# Patient Record
Sex: MALE | Race: WHITE | NOT HISPANIC OR LATINO | Employment: UNEMPLOYED | RURAL
[De-identification: names, ages, dates, MRNs, and addresses within clinical notes are randomized per-mention and may not be internally consistent; named-entity substitution may affect disease eponyms.]

---

## 2023-08-24 ENCOUNTER — OFFICE VISIT (OUTPATIENT)
Dept: CARDIOLOGY | Facility: CLINIC | Age: 58
End: 2023-08-24
Payer: OTHER GOVERNMENT

## 2023-08-24 VITALS
DIASTOLIC BLOOD PRESSURE: 74 MMHG | HEIGHT: 64 IN | WEIGHT: 252 LBS | SYSTOLIC BLOOD PRESSURE: 128 MMHG | BODY MASS INDEX: 43.02 KG/M2 | HEART RATE: 84 BPM | OXYGEN SATURATION: 96 %

## 2023-08-24 DIAGNOSIS — G47.33 OSA (OBSTRUCTIVE SLEEP APNEA): Primary | ICD-10-CM

## 2023-08-24 RX ORDER — PRAVASTATIN SODIUM 40 MG
40 TABLET ORAL DAILY
COMMUNITY
Start: 2021-06-11

## 2023-08-24 RX ORDER — METFORMIN HYDROCHLORIDE 500 MG/1
500 TABLET, EXTENDED RELEASE ORAL
COMMUNITY
Start: 2023-08-10

## 2023-08-24 RX ORDER — LISINOPRIL 20 MG/1
20 TABLET ORAL DAILY
COMMUNITY

## 2023-08-24 RX ORDER — DULAGLUTIDE 0.75 MG/.5ML
INJECTION, SOLUTION SUBCUTANEOUS
COMMUNITY
Start: 2023-08-21

## 2023-08-24 NOTE — ASSESSMENT & PLAN NOTE
Baseline AHI 65.  This is severe sleep apnea.  He is on CPAP therapy.  Download is reviewed with good control and good compliance.  He is benefiting from PAP therapy and we plan to continue PAP therapy.    He reports that he buys all of his supplies from [employer] or [employer].  He reports he does not use a DME as his insurance has not been helpful for the payment of supplies.    Plan follow-up in 1 year or sooner for any JUSTA or PAP concerns.

## 2023-08-24 NOTE — PROGRESS NOTES
"    Follow-Up Sleep Consult     Date:   2023  Name: Mandeep Kwok  :   1965  PCP: Belinda Arora MD    Chief Complaint   Patient presents with    Sleep Apnea       Subjective     History of Present Illness  Mandeep Kwok is a 58 y.o. male who presents today for follow-up on annual follow-up of known history of severe JUSTA.  He is on CPAP therapy.    Sleep and cardiac history:    JUSTA AHI 65 on 2018    Current JUSTA therapy auto CPAP 10 to 17 cm    Hypertension and hyperlipidemia    2022 low risk stress echo    Echo 2022 EF WNL      Current mask used is Nasal Pillows     Device Functioning Well: Yes - yes he is DS#2 that he was gifted by a brother as he had a very old System One that did not get replaced on the [employer].   Mask Fit Comfortable: Yes  Air Flow Comfortable: Yes  DME Helpful for Supplies: he does not use DME due to cost.   Sleep is rested: Yes        Device Download:           The patient's relevant past medical, surgical, family, and social history reviewed and updated in Epic as appropriate.    Past Medical History:   Diagnosis Date    Hypertension     JUSTA (obstructive sleep apnea)      History reviewed. No pertinent surgical history.    No Known Allergies  Prior to Admission medications    Medication Sig Start Date End Date Taking? Authorizing Provider   lisinopril (PRINIVIL,ZESTRIL) 20 MG tablet Take 1 tablet by mouth Daily.   Yes Dora Raymond MD   metFORMIN ER (GLUCOPHAGE-XR) 500 MG 24 hr tablet Take 1 tablet by mouth Daily With Breakfast. 8/10/23  Yes Dora Raymond MD   pravastatin (PRAVACHOL) 40 MG tablet Take 1 tablet by mouth Daily. 21  Yes Dora Raymond MD   Trulicity 0.75 MG/0.5ML solution pen-injector  23  Yes Dora Raymond MD     History reviewed. No pertinent family history.    Objective     Vital Signs:  /74 (BP Location: Right arm, Patient Position: Sitting)   Pulse 84   Ht 162.6 cm (64\")   Wt 114 " kg (252 lb)   SpO2 96%   BMI 43.26 kg/mý     Class 3 Severe Obesity (BMI >=40). Obesity-related health conditions include the following: obstructive sleep apnea, hypertension, diabetes mellitus, and dyslipidemias. Obesity is unchanged. BMI is is above average; BMI management plan is completed. We discussed portion control and increasing exercise.        Physical Exam  HENT:      Head: Normocephalic.      Nose: Nose normal.      Mouth/Throat:      Mouth: Mucous membranes are moist.   Pulmonary:      Effort: Pulmonary effort is normal.   Skin:     General: Skin is warm and dry.   Neurological:      Mental Status: He is alert and oriented to person, place, and time.   Psychiatric:         Mood and Affect: Mood normal.         Behavior: Behavior normal.         Thought Content: Thought content normal.           PAP download reviewed: 7/25 through 8/23/2023.  30-day download.  I have reviewed and interpreted the data.         Assessment and Plan     Diagnoses and all orders for this visit:    1. JUSTA (obstructive sleep apnea) (Primary)  Assessment & Plan:  Baseline AHI 65.  This is severe sleep apnea.  He is on CPAP therapy.  Download is reviewed with good control and good compliance.  He is benefiting from PAP therapy and we plan to continue PAP therapy.    He reports that he buys all of his supplies from [employer] or [employer].  He reports he does not use a DME as his insurance has not been helpful for the payment of supplies.    Plan follow-up in 1 year or sooner for any JUSTA or PAP concerns.          Report if any new/changing symptoms immediately         Follow Up  Return in about 1 year (around 8/24/2024) for JUSTA.  Patient was given instructions and counseling regarding his condition or for health maintenance advice. Please see specific information pulled into the AVS if appropriate.

## 2024-08-22 ENCOUNTER — OFFICE VISIT (OUTPATIENT)
Dept: CARDIOLOGY | Facility: CLINIC | Age: 59
End: 2024-08-22
Payer: OTHER GOVERNMENT

## 2024-08-22 VITALS
HEIGHT: 64 IN | SYSTOLIC BLOOD PRESSURE: 110 MMHG | DIASTOLIC BLOOD PRESSURE: 74 MMHG | OXYGEN SATURATION: 98 % | BODY MASS INDEX: 42.68 KG/M2 | HEART RATE: 86 BPM | WEIGHT: 250 LBS

## 2024-08-22 DIAGNOSIS — G47.33 OSA (OBSTRUCTIVE SLEEP APNEA): Primary | ICD-10-CM

## 2024-08-22 PROCEDURE — 99213 OFFICE O/P EST LOW 20 MIN: CPT | Performed by: NURSE PRACTITIONER

## 2024-08-22 RX ORDER — DULAGLUTIDE 4.5 MG/.5ML
INJECTION, SOLUTION SUBCUTANEOUS
COMMUNITY
Start: 2024-05-25

## 2024-08-22 NOTE — PROGRESS NOTES
Follow-Up Sleep Consult     Date:   2024  Name: Mandeep Kwok  :   1965  PCP: Belinda Arora MD    Chief Complaint   Patient presents with    Sleep Apnea       Subjective     History of Present Illness  Mandeep Kwok is a 59 y.o. male who presents today for follow-up on JUSTA.  He comes in today for his annual follow-up visit on his known history of severe sleep apnea on CPAP therapy.    He reports that since our last visit that he is off his loaner device.  That he did receive his replacement device.  He reports the airflow was a bit strong and he decreased his pressures from 10 to 8 cm.  He reports the airflow is very comfortable.  He reports he is very satisfied with the device.    He denies any chest pain, shortness of breath.  Reports he sees his PCP for his high blood pressure and hyperlipidemia and has completed his annual labs.    Sleep and cardiac history:    JUSTA AHI 65 on 2018    Current JUSTA therapy auto CPAP 8 -14 cm    Hypertension and hyperlipidemia    2022 low risk stress echo    Echo 2022 EF WNL      Current mask used is nasal pillows    Device Functioning Well: Yes  Mask Fit Comfortable: Yes  Air Flow Comfortable: Yes  DME Helpful for Supplies: No, he buys CPAP supplies online due to cost.  Sleep is rested: Yes        Device Download:                 The patient's relevant past medical, surgical, family, and social history reviewed and updated in Epic as appropriate.    Past Medical History:   Diagnosis Date    Hypertension     JUSTA (obstructive sleep apnea)      History reviewed. No pertinent surgical history.    No Known Allergies  Prior to Admission medications    Medication Sig Start Date End Date Taking? Authorizing Provider   lisinopril (PRINIVIL,ZESTRIL) 20 MG tablet Take 1 tablet by mouth Daily.   Yes Dora Raymond MD   metFORMIN ER (GLUCOPHAGE-XR) 500 MG 24 hr tablet Take 1 tablet by mouth Daily With Breakfast. 8/10/23  Yes Dora Raymond  "MD   pravastatin (PRAVACHOL) 40 MG tablet Take 1 tablet by mouth Daily. 6/11/21  Yes Provider, Historical, MD   Trulicity 0.75 MG/0.5ML solution pen-injector  8/21/23  Yes Provider, Historical, MD   Trulicity 4.5 MG/0.5ML solution pen-injector  5/25/24  Yes Provider, Historical, MD     History reviewed. No pertinent family history.    Objective     Vital Signs:  /74   Pulse 86   Ht 162.6 cm (64\")   Wt 113 kg (250 lb)   SpO2 98%   BMI 42.91 kg/m²              Physical Exam  HENT:      Head: Normocephalic.      Nose: Nose normal.      Mouth/Throat:      Mouth: Mucous membranes are moist.   Pulmonary:      Effort: Pulmonary effort is normal.   Skin:     General: Skin is warm and dry.   Neurological:      Mental Status: He is alert and oriented to person, place, and time.   Psychiatric:         Mood and Affect: Mood normal.         Behavior: Behavior normal.         Thought Content: Thought content normal.         The following data was reviewed by: ANGELO Cannon on 08/22/2024:    PAP download reviewed: 30-day download as above.  I have reviewed and interpreted the data on the download at today's visit.         Assessment and Plan     Diagnoses and all orders for this visit:    1. JUSTA (obstructive sleep apnea) (Primary)  Assessment & Plan:  Baseline AHI is 65.  This is severe sleep apnea.    He is on CPAP therapy.  Download is reviewed with good control and good compliance.    He is benefiting from PAP therapy.  We plan to continue PAP therapy.    He declines a CPAP prescription to be sent to a [employer] company.  He reports he buys his supplies from online company due to the cost.    He is encouraged to contact his human resources department or insurance company and discuss if he has any CPAP supply benefits.    Plan follow-up in 1 year or sooner for any JUSTA or PAP concerns.          Report if any new/changing symptoms immediately         Follow Up  Return in about 1 year (around 8/22/2025) for " JUSTA.  Patient was given instructions and counseling regarding his condition or for health maintenance advice. Please see specific information pulled into the AVS if appropriate.

## 2024-08-22 NOTE — ASSESSMENT & PLAN NOTE
Baseline AHI is 65.  This is severe sleep apnea.    He is on CPAP therapy.  Download is reviewed with good control and good compliance.    He is benefiting from PAP therapy.  We plan to continue PAP therapy.    He declines a CPAP prescription to be sent to a [employer] company.  He reports he buys his supplies from online company due to the cost.    He is encouraged to contact his human resources department or insurance company and discuss if he has any CPAP supply benefits.    Plan follow-up in 1 year or sooner for any JUSTA or PAP concerns.

## 2024-08-23 ENCOUNTER — PATIENT ROUNDING (BHMG ONLY) (OUTPATIENT)
Dept: CARDIOLOGY | Facility: CLINIC | Age: 59
End: 2024-08-23
Payer: OTHER GOVERNMENT

## 2024-08-23 NOTE — PROGRESS NOTES
..My name is Aida Arias and I am the Practice Manager for [facility].    I would like to thank you for being a loyal patient. If you do not mind I would like to ask you a few questions about your recent visit with us.  Please feel free to reply if you wish to provide us with feedback on your first visit with our practice.    First, could you tell me what went well with your recent visit?    Secondly, we are always looking for ways to make our patients' experiences even better.  Do you have any recommendations on ways we may improve?    Finally, overall were you satisfied with your first visit to us as a [facility] [place] facility?    In the next few days, you will be receiving a Patient Experience Survey.      Thank you for taking the time to answer a few questions today.  I hope you have a good day.

## 2025-08-21 ENCOUNTER — OFFICE VISIT (OUTPATIENT)
Dept: CARDIOLOGY | Facility: CLINIC | Age: 60
End: 2025-08-21
Payer: OTHER GOVERNMENT

## 2025-08-21 ENCOUNTER — PATIENT ROUNDING (BHMG ONLY) (OUTPATIENT)
Dept: CARDIOLOGY | Facility: CLINIC | Age: 60
End: 2025-08-21

## 2025-08-21 VITALS
HEART RATE: 71 BPM | OXYGEN SATURATION: 97 % | BODY MASS INDEX: 41.32 KG/M2 | DIASTOLIC BLOOD PRESSURE: 66 MMHG | SYSTOLIC BLOOD PRESSURE: 106 MMHG | WEIGHT: 242 LBS | HEIGHT: 64 IN

## 2025-08-21 DIAGNOSIS — G47.33 OSA (OBSTRUCTIVE SLEEP APNEA): Primary | ICD-10-CM

## 2025-08-21 PROCEDURE — 99213 OFFICE O/P EST LOW 20 MIN: CPT | Performed by: NURSE PRACTITIONER

## 2025-08-21 RX ORDER — DULAGLUTIDE 4.5 MG/.5ML
INJECTION, SOLUTION SUBCUTANEOUS
COMMUNITY
Start: 2025-06-19